# Patient Record
Sex: FEMALE | Race: WHITE
[De-identification: names, ages, dates, MRNs, and addresses within clinical notes are randomized per-mention and may not be internally consistent; named-entity substitution may affect disease eponyms.]

---

## 2018-05-04 ENCOUNTER — HOSPITAL ENCOUNTER (EMERGENCY)
Dept: HOSPITAL 62 - ER | Age: 28
Discharge: HOME | End: 2018-05-04
Payer: OTHER GOVERNMENT

## 2018-05-04 VITALS — DIASTOLIC BLOOD PRESSURE: 67 MMHG | SYSTOLIC BLOOD PRESSURE: 108 MMHG

## 2018-05-04 DIAGNOSIS — R10.30: ICD-10-CM

## 2018-05-04 DIAGNOSIS — R11.0: ICD-10-CM

## 2018-05-04 DIAGNOSIS — N76.0: Primary | ICD-10-CM

## 2018-05-04 DIAGNOSIS — B96.89: ICD-10-CM

## 2018-05-04 LAB
ADD MANUAL DIFF: NO
ALBUMIN SERPL-MCNC: 4.1 G/DL (ref 3.5–5)
ALP SERPL-CCNC: 57 U/L (ref 38–126)
ALT SERPL-CCNC: 25 U/L (ref 9–52)
ANION GAP SERPL CALC-SCNC: 13 MMOL/L (ref 5–19)
APPEARANCE UR: (no result)
APTT PPP: YELLOW S
AST SERPL-CCNC: 22 U/L (ref 14–36)
BACTERIA (WET MOUNT): (no result)
BASOPHILS # BLD AUTO: 0 10^3/UL (ref 0–0.2)
BASOPHILS NFR BLD AUTO: 0.4 % (ref 0–2)
BILIRUB SERPL-MCNC: < 0.1 MG/DL (ref 0.2–1.3)
BILIRUB UR QL STRIP: NEGATIVE
BUN SERPL-MCNC: 9 MG/DL (ref 7–20)
CALCIUM: 9.3 MG/DL (ref 8.4–10.2)
CHLAM PCR: NOT DETECTED
CHLORIDE SERPL-SCNC: 103 MMOL/L (ref 98–107)
CO2 SERPL-SCNC: 26 MMOL/L (ref 22–30)
EOSINOPHIL # BLD AUTO: 0 10^3/UL (ref 0–0.6)
EOSINOPHIL NFR BLD AUTO: 0.3 % (ref 0–6)
ERYTHROCYTE [DISTWIDTH] IN BLOOD BY AUTOMATED COUNT: 13.7 % (ref 11.5–14)
GLUCOSE SERPL-MCNC: 83 MG/DL (ref 75–110)
GLUCOSE UR STRIP-MCNC: NEGATIVE MG/DL
GON PCR: NOT DETECTED
HCT VFR BLD CALC: 39 % (ref 36–47)
HGB BLD-MCNC: 12.9 G/DL (ref 12–15.5)
KETONES UR STRIP-MCNC: NEGATIVE MG/DL
LYMPHOCYTES # BLD AUTO: 2.8 10^3/UL (ref 0.5–4.7)
LYMPHOCYTES NFR BLD AUTO: 31.8 % (ref 13–45)
MCH RBC QN AUTO: 30.2 PG (ref 27–33.4)
MCHC RBC AUTO-ENTMCNC: 33.1 G/DL (ref 32–36)
MCV RBC AUTO: 91 FL (ref 80–97)
MONOCYTES # BLD AUTO: 0.6 10^3/UL (ref 0.1–1.4)
MONOCYTES NFR BLD AUTO: 6.4 % (ref 3–13)
NEUTROPHILS # BLD AUTO: 5.4 10^3/UL (ref 1.7–8.2)
NEUTS SEG NFR BLD AUTO: 61.1 % (ref 42–78)
NITRITE UR QL STRIP: NEGATIVE
PH UR STRIP: 5 [PH] (ref 5–9)
PLATELET # BLD: 266 10^3/UL (ref 150–450)
POTASSIUM SERPL-SCNC: 4.1 MMOL/L (ref 3.6–5)
PROT SERPL-MCNC: 7.5 G/DL (ref 6.3–8.2)
PROT UR STRIP-MCNC: NEGATIVE MG/DL
RBC # BLD AUTO: 4.27 10^6/UL (ref 3.72–5.28)
RBCS (WET MOUNT): (no result)
SODIUM SERPL-SCNC: 142.3 MMOL/L (ref 137–145)
SP GR UR STRIP: 1.01
T.VAGINALIS (WET MOUNT): (no result)
TOTAL CELLS COUNTED % (AUTO): 100 %
UROBILINOGEN UR-MCNC: NEGATIVE MG/DL (ref ?–2)
WBC # BLD AUTO: 8.9 10^3/UL (ref 4–10.5)
WBCS (WET MOUNT): (no result)
YEAST (WET MOUNT): (no result)

## 2018-05-04 PROCEDURE — 80053 COMPREHEN METABOLIC PANEL: CPT

## 2018-05-04 PROCEDURE — S0119 ONDANSETRON 4 MG: HCPCS

## 2018-05-04 PROCEDURE — 99283 EMERGENCY DEPT VISIT LOW MDM: CPT

## 2018-05-04 PROCEDURE — 85025 COMPLETE CBC W/AUTO DIFF WBC: CPT

## 2018-05-04 PROCEDURE — 81001 URINALYSIS AUTO W/SCOPE: CPT

## 2018-05-04 PROCEDURE — 87210 SMEAR WET MOUNT SALINE/INK: CPT

## 2018-05-04 PROCEDURE — 87491 CHLMYD TRACH DNA AMP PROBE: CPT

## 2018-05-04 PROCEDURE — 87591 N.GONORRHOEAE DNA AMP PROB: CPT

## 2018-05-04 PROCEDURE — 36415 COLL VENOUS BLD VENIPUNCTURE: CPT

## 2018-05-04 PROCEDURE — 84703 CHORIONIC GONADOTROPIN ASSAY: CPT

## 2018-05-04 NOTE — ER DOCUMENT REPORT
ED Medical Screen (RME)





- General


Chief Complaint: Abdominal Pain


Stated Complaint: PELVIC/BACK PAIN, VAGINAL DISCHARGE


Time Seen by Provider: 05/04/18 13:30


Notes: 





28-year-old female patient reports LMP 4/19/2018, was on time but would lighter 

than usual.  She reports onset 4 days ago of low back and pelvic area pain and 

discomfort which has been getting worse.  This morning she developed a brownish 

red discharge which is unusual and not like anything she is seen before.  She 

is not on birth control.








I have greeted and performed a rapid initial assessment of this patient.  A 

comprehensive ED assessment and evaluation of the patient, analysis of test 

results and completion of the medical decision making process will be conducted 

by additional ED providers.


TRAVEL OUTSIDE OF THE [place] IN LAST 30 DAYS: No





Past Medical History





- Social History


Chew tobacco use (# tins/day): No


Frequency of alcohol use: None


Drug Abuse: None


Renal/ Medical History: Denies: Hx Peritoneal Dialysis





Physical Exam





- Vital signs


Vitals: 





 











Temp Pulse Resp BP Pulse Ox


 


 97.9 F   66   16   115/68   98 


 


 05/04/18 13:17  05/04/18 13:17  05/04/18 13:17  05/04/18 13:17  05/04/18 13:17














Course





- Vital Signs


Vital signs: 





 











Temp Pulse Resp BP Pulse Ox


 


 97.9 F   66   16   115/68   98 


 


 05/04/18 13:17  05/04/18 13:17  05/04/18 13:17  05/04/18 13:17  05/04/18 13:17

## 2018-05-04 NOTE — ER DOCUMENT REPORT
ED General





- General


Chief Complaint: Abdominal Pain


Stated Complaint: PELVIC/BACK PAIN, VAGINAL DISCHARGE


Time Seen by Provider: 05/04/18 13:30


Mode of Arrival: Ambulatory


Information source: Patient


Notes: 





28-year-old female with a history of anxiety presents with complaint of 

abdominal pain that started 5 days prior to arrival.  Pain is located in the 

lower abdomen, described as cramping, intermittent and not relieved with 

Tylenol or Motrin.  Patient has had associated nausea without vomiting.  Her 

last menstrual period was April 19, 2018.  She states that she has had some 

brown bloody discharge over the last few days.  She is currently sexually 

active with one partner and does not use birth control.  She denies sick 

contacts, new medications, recent antibiotic use.


TRAVEL OUTSIDE OF THE [place] IN LAST 30 DAYS: No





- HPI


Onset: Last week


Onset/Duration: Gradual, Intermittent


Quality of pain: Cramping


Associated symptoms: Nausea.  denies: Chest pain, Diarrhea, Fever, Vomiting, 

Shortness of breath, Weakness


Exacerbated by: Denies


Relieved by: Denies


Similar symptoms previously: No


Recently seen / treated by doctor: No





Past Medical History





- General


Information source: Patient





- Social History


Smoking Status: Never Smoker


Chew tobacco use (# tins/day): No


Frequency of alcohol use: None


Drug Abuse: None


Lives with: Spouse/Significant other


Family History: Reviewed & Not Pertinent


Patient has suicidal ideation: No


Patient has homicidal ideation: No





- Medical History


Medical History: Other - Anxiety


Renal/ Medical History: Denies: Hx Peritoneal Dialysis





Review of Systems





- Review of Systems


Notes: 





Patient denies fever, chills, vomiting, headache, ear pain, sore throat, cough, 

chest pain, shortness of breath, back pain, dysuria, hematuria, rash, SI/HI.





Physical Exam





- Vital signs


Vitals: 


 











Temp Pulse Resp BP Pulse Ox


 


 97.9 F   66   16   115/68   98 


 


 05/04/18 13:17  05/04/18 13:17  05/04/18 13:17  05/04/18 13:17  05/04/18 13:17











Interpretation: Normal.  No: Hypertensive, Febrile





- Notes


Notes: 





PHYSICAL EXAMINATION:





GENERAL: Well-appearing, well-nourished and in no acute distress.





HEAD: Atraumatic, normocephalic.





EYES: Pupils equal round and reactive to light, extraocular movements intact, 

conjunctiva are normal.





ENT: Nares patent, oropharynx clear without exudates.  Moist mucous membranes.





NECK: Normal range of motion, supple without lymphadenopathy





LUNGS: Breath sounds clear to auscultation bilaterally and equal.  No wheezes 

rales or rhonchi.





HEART: Regular rate and rhythm without murmurs





ABDOMEN: Soft, nontender, nondistended abdomen.  No guarding, no rebound.  No 

masses appreciated.





Female : Thin white discharge, no foul odor, no vaginal lacerations, no 

external lesions.  There is a circular yellow 0.25 cm area on the cervix that 

was not tender with manipulation.  No CMT.





Musculoskeletal: Normal range of motion, no pitting or edema.  No cyanosis.





NEUROLOGICAL: Cranial nerves grossly intact.  Normal speech, normal gait.  

Normal sensory, motor exams





PSYCH: Normal mood, normal affect.





SKIN: Warm, Dry, normal turgor, no rashes or lesions noted.





Course





- Re-evaluation


Re-evalutation: 








Laboratory











  05/04/18 05/04/18 05/04/18





  13:40 13:40 13:40


 


WBC  8.9  


 


RBC  4.27  


 


Hgb  12.9  


 


Hct  39.0  


 


MCV  91  


 


MCH  30.2  


 


MCHC  33.1  


 


RDW  13.7  


 


Plt Count  266  


 


Seg Neutrophils %  61.1  


 


Lymphocytes %  31.8  


 


Monocytes %  6.4  


 


Eosinophils %  0.3  


 


Basophils %  0.4  


 


Absolute Neutrophils  5.4  


 


Absolute Lymphocytes  2.8  


 


Absolute Monocytes  0.6  


 


Absolute Eosinophils  0.0  


 


Absolute Basophils  0.0  


 


Sodium   142.3 


 


Potassium   4.1 


 


Chloride   103 


 


Carbon Dioxide   26 


 


Anion Gap   13 


 


BUN   9 


 


Creatinine   0.68 


 


Est GFR ( Amer)   > 60 


 


Est GFR (Non-Af Amer)   > 60 


 


Glucose   83 


 


Calcium   9.3 


 


Total Bilirubin   < 0.1 L 


 


Direct Bilirubin    


 


Neonat Total Bilirubin   Not Reportable 


 


Neonat Direct Bilirubin   Not Reportable 


 


Neonat Indirect Bili   Not Reportable 


 


AST   22 


 


ALT   25 


 


Alkaline Phosphatase   57 


 


Total Protein   7.5 


 


Albumin   4.1 


 


Serum HCG, Qual    NEGATIVE


 


Urine Color   


 


Urine Appearance   


 


Urine pH   


 


Ur Specific Gravity   


 


Urine Protein   


 


Urine Glucose (UA)   


 


Urine Ketones   


 


Urine Blood   


 


Urine Nitrite   


 


Urine Bilirubin   


 


Urine Urobilinogen   


 


Ur Leukocyte Esterase   


 


Urine WBC (Auto)   


 


Urine RBC (Auto)   


 


Squamous Epi Cells Auto   


 


Urine Mucus (Auto)   


 


Urine Ascorbic Acid   


 


Bacteria (Wet Prep)   


 


Trichomonas (Wet Prep)   


 


Vaginal WBC   


 


Vaginal RBC   


 


Vaginal Yeast   














  05/04/18 05/04/18





  13:40 14:56


 


WBC  


 


RBC  


 


Hgb  


 


Hct  


 


MCV  


 


MCH  


 


MCHC  


 


RDW  


 


Plt Count  


 


Seg Neutrophils %  


 


Lymphocytes %  


 


Monocytes %  


 


Eosinophils %  


 


Basophils %  


 


Absolute Neutrophils  


 


Absolute Lymphocytes  


 


Absolute Monocytes  


 


Absolute Eosinophils  


 


Absolute Basophils  


 


Sodium  


 


Potassium  


 


Chloride  


 


Carbon Dioxide  


 


Anion Gap  


 


BUN  


 


Creatinine  


 


Est GFR ( Amer)  


 


Est GFR (Non-Af Amer)  


 


Glucose  


 


Calcium  


 


Total Bilirubin  


 


Direct Bilirubin  


 


Neonat Total Bilirubin  


 


Neonat Direct Bilirubin  


 


Neonat Indirect Bili  


 


AST  


 


ALT  


 


Alkaline Phosphatase  


 


Total Protein  


 


Albumin  


 


Serum HCG, Qual  


 


Urine Color  YELLOW 


 


Urine Appearance  SLIGHTLY-CLOUDY 


 


Urine pH  5.0 


 


Ur Specific Gravity  1.014 


 


Urine Protein  NEGATIVE 


 


Urine Glucose (UA)  NEGATIVE 


 


Urine Ketones  NEGATIVE 


 


Urine Blood  SMALL H 


 


Urine Nitrite  NEGATIVE 


 


Urine Bilirubin  NEGATIVE 


 


Urine Urobilinogen  NEGATIVE 


 


Ur Leukocyte Esterase  TRACE H 


 


Urine WBC (Auto)  4 


 


Urine RBC (Auto)  1 


 


Squamous Epi Cells Auto  5 


 


Urine Mucus (Auto)  RARE 


 


Urine Ascorbic Acid  NEGATIVE 


 


Bacteria (Wet Prep)   4+ BACTERIA SEEN


 


Trichomonas (Wet Prep)   NO TRICHOMONAS SEEN


 


Vaginal WBC   2+ WBCS SEEN


 


Vaginal RBC   NO RBCS SEEN


 


Vaginal Yeast   NO YEAST SEEN











05/04/18 14:24


28-year-old female with a history of anxiety presents with complaint of 

abdominal pain that started 5 days prior to arrival.  Pain is located in the 

lower abdomen, described as cramping, intermittent and not relieved with 

Tylenol or Motrin.  Patient has had associated nausea without vomiting.  Her 

last menstrual period was April 19, 2018.  She states that she has had some 

brown bloody discharge over the last few days.  She is currently sexually 

active with one partner and does not use birth control.  She denies sick 

contacts, new medications, recent antibiotic use.  Upon arrival vitals reviewed 

and within normal limits.  Patient does not appear toxic or dehydrated.  She is 

in no acute distress.  Exam and laboratory findings consistent with bacterial 

vaginosis.  She will be prescribed Flagyl for 1 week.


05/04/18 15:58





05/04/18 21:07








- Vital Signs


Vital signs: 


 











Temp Pulse Resp BP Pulse Ox


 


 98.4 F   55 L  18   108/67   100 


 


 05/04/18 16:26  05/04/18 16:26  05/04/18 16:26  05/04/18 16:26  05/04/18 16:26














- Laboratory


Result Diagrams: 


 05/04/18 13:40





 05/04/18 13:40


Laboratory results interpreted by me: 


 











  05/04/18 05/04/18





  13:40 13:40


 


Total Bilirubin  < 0.1 L 


 


Urine Blood   SMALL H


 


Ur Leukocyte Esterase   TRACE H














Discharge





- Discharge


Clinical Impression: 


 Bacterial vaginosis





Condition: Good


Disposition: HOME, SELF-CARE


Instructions:  Vaginosis, Bacterial ([facility])


Additional Instructions: 


Follow up with your physician tomorrow for further care or return to the ED 

IMMEDIATELY if symptoms worsen or new concerns occur. If you cannot afford to 

follow up with your primary care physician a list of low cost clinics have been 

provided at the end of your discharge papers as well.


Prescriptions: 


Metronidazole [Flagyl 500 mg Tablet] 500 mg PO BID #14 tablet

## 2019-03-16 ENCOUNTER — HOSPITAL ENCOUNTER (INPATIENT)
Dept: HOSPITAL 62 - LC | Age: 29
LOS: 2 days | Discharge: HOME | End: 2019-03-18
Attending: OBSTETRICS & GYNECOLOGY | Admitting: OBSTETRICS & GYNECOLOGY
Payer: MEDICAID

## 2019-03-16 DIAGNOSIS — Z3A.38: ICD-10-CM

## 2019-03-16 LAB
ADD MANUAL DIFF: NO
APPEARANCE UR: (no result)
APTT PPP: YELLOW S
BARBITURATES UR QL SCN: NEGATIVE
BASOPHILS # BLD AUTO: 0 10^3/UL (ref 0–0.2)
BASOPHILS NFR BLD AUTO: 0.3 % (ref 0–2)
BILIRUB UR QL STRIP: NEGATIVE
EOSINOPHIL # BLD AUTO: 0 10^3/UL (ref 0–0.6)
EOSINOPHIL NFR BLD AUTO: 0.3 % (ref 0–6)
ERYTHROCYTE [DISTWIDTH] IN BLOOD BY AUTOMATED COUNT: 13.2 % (ref 11.5–14)
GLUCOSE UR STRIP-MCNC: NEGATIVE MG/DL
HCT VFR BLD CALC: 36.4 % (ref 36–47)
HGB BLD-MCNC: 12.2 G/DL (ref 12–15.5)
KETONES UR STRIP-MCNC: NEGATIVE MG/DL
LYMPHOCYTES # BLD AUTO: 2.7 10^3/UL (ref 0.5–4.7)
LYMPHOCYTES NFR BLD AUTO: 24 % (ref 13–45)
MCH RBC QN AUTO: 31.1 PG (ref 27–33.4)
MCHC RBC AUTO-ENTMCNC: 33.6 G/DL (ref 32–36)
MCV RBC AUTO: 92 FL (ref 80–97)
METHADONE UR QL SCN: NEGATIVE
MONOCYTES # BLD AUTO: 1.1 10^3/UL (ref 0.1–1.4)
MONOCYTES NFR BLD AUTO: 10.3 % (ref 3–13)
NEUTROPHILS # BLD AUTO: 7.2 10^3/UL (ref 1.7–8.2)
NEUTS SEG NFR BLD AUTO: 65.1 % (ref 42–78)
NITRITE UR QL STRIP: NEGATIVE
PCP UR QL SCN: NEGATIVE
PH UR STRIP: 5 [PH] (ref 5–9)
PLATELET # BLD: 192 10^3/UL (ref 150–450)
PROT UR STRIP-MCNC: 30 MG/DL
RBC # BLD AUTO: 3.93 10^6/UL (ref 3.72–5.28)
SP GR UR STRIP: 1.02
TOTAL CELLS COUNTED % (AUTO): 100 %
URINE AMPHETAMINES SCREEN: NEGATIVE
URINE BENZODIAZEPINES SCREEN: NEGATIVE
URINE COCAINE SCREEN: NEGATIVE
URINE MARIJUANA (THC) SCREEN: NEGATIVE
UROBILINOGEN UR-MCNC: 4 MG/DL (ref ?–2)
WBC # BLD AUTO: 11.1 10^3/UL (ref 4–10.5)

## 2019-03-16 PROCEDURE — 85027 COMPLETE CBC AUTOMATED: CPT

## 2019-03-16 PROCEDURE — 80307 DRUG TEST PRSMV CHEM ANLYZR: CPT

## 2019-03-16 PROCEDURE — 86900 BLOOD TYPING SEROLOGIC ABO: CPT

## 2019-03-16 PROCEDURE — 86850 RBC ANTIBODY SCREEN: CPT

## 2019-03-16 PROCEDURE — 86901 BLOOD TYPING SEROLOGIC RH(D): CPT

## 2019-03-16 PROCEDURE — 36415 COLL VENOUS BLD VENIPUNCTURE: CPT

## 2019-03-16 PROCEDURE — 85025 COMPLETE CBC W/AUTO DIFF WBC: CPT

## 2019-03-16 PROCEDURE — 81005 URINALYSIS: CPT

## 2019-03-16 PROCEDURE — 4A1HXCZ MONITORING OF PRODUCTS OF CONCEPTION, CARDIAC RATE, EXTERNAL APPROACH: ICD-10-PCS | Performed by: OBSTETRICS & GYNECOLOGY

## 2019-03-16 PROCEDURE — 86592 SYPHILIS TEST NON-TREP QUAL: CPT

## 2019-03-16 NOTE — ADMISSION PHYSICAL
=================================================================



=================================================================

Datetime Report Generated by CPN: 2019 23:04

   

   

=================================================================

CURRENT ADMISSION

=================================================================

   

Chief Complaint:  Uterine Contractions

Indication for Induction:  Not Applicable

Admit Impression :  Term, Intrauterine Pregnancy; Active Labor

Admit Plan:  Admit to Unit; Initiate Labor Protocol

   

=================================================================

ALLERGIES

=================================================================

   

Medication Allergies:  Yes

Medication Allergies:  Cefaclor (2019)

Latex:  Latex Allergies

   

=================================================================

OBSTETRICAL HISTORY

=================================================================

   

EDC:  2019 00:00

:  4

Para:  3

Term:  3

:  0

SAB:  0

IAB:  0

Living:  3

Gestational Diabetes:  No

Rh Sensitization:  No

Incompetent Cervix:  No

SURAJ:  No

Infertility:  No

ART Treatment:  No

Uterine Anomaly:  No

IUGR:  No

Hx Previous C/S:  No

Macrosomia:  No

Hx Loss/Stillborn:  No

PIH:  No

Hx  Death:  No

Placenta Previa/Abruption:  No

Depression/PP Depression:  No

PTL/PROM:  No

Post Partum Hemorrhage:  No

Current Pregnancy Procedures:  Ultrasound

Obstetrical History Comments:  G1 -   @ 40 weeks; Baby Girl

   7lbs 15oz, natural

   G2 -   @ 40 weeks; Baby Girl 6lbs 14oz, natural, GBS+

   G3 - 2013  @ 40 weeks; Baby Boy 7lbs 11oz, natural, cervical tear

   G4 - Current

   

=================================================================

***SEE PRENATAL RECORDS***

=================================================================

   

Alcohol:  No

Marijuana :  No

Cocaine:  No

Other Illicit Drugs:  No

Cigarettes:  Never Smoker. [id number]

   

=================================================================

MEDICAL HISTORY

=================================================================

   

Diabetes:  No

Blood Transfusion:  No

Pulmonary Disease (Asthma, TB):  No

Breast Disease:  No

Hypertension:  No

Gyn Surgery:  Yes

Heart Disease:  No

Hosp/Surgery:  Yes

Autoimmune Disorder:  No

Anesthetic Complications:  No

Kidney Disease:  No

Abnormal Pap Smear:  No

Neuro/Epilepsy:  No

Psychiatric Disorders:  No

Other Medical Diseases:  No

Hepatitis/Liver Disease:  No

Significant Family History:  No

Varicosities/Phlebitis:  No

Trauma/Violence :  No

Thyroid Dysfunction:  No

Medical History Comments:  Previous hospitalization - tonsillectomy

   (), childbirth x's 3

   

=================================================================

INFECTIOUS HISTORY

=================================================================

   

Gonorrhea:  No

Genital Herpes:  No

Chlamydia:  No

Tuberculosis:  No

Syphilis:  No

Hepatitis:  No

HIV/AIDS Exposure:  No

Rash or Viral Illness:  No

HPV:  No

   

=================================================================

PHYSICAL EXAM

=================================================================

   

General:  Normal

HEENT:  Normal

Neurologic:  Normal

Thyroid:  Deferred

Heart:  Normal

Lungs:  Normal

Breast:  Normal

Back:  Deferred

Abdomen:  Normal

Genitourinary Exam:  Normal

Extremities:  Normal

DTRs:  Normal

Pelvic Type:  Adequate

Vital Signs:  Reviewed; Within Normal Limits

   

=================================================================

VAGINAL EXAM

=================================================================

   

Dilatation:  9

Effacement:  90

Station:  0

   

=================================================================

MEMBRANES

=================================================================

   

Membranes:  Ruptured

Amniotic Fluid Color:  Meconium, Light

   

=================================================================

FETUS A

=================================================================

   

EGA:  38.5

Monitoring:  External US

FHR- Baseline:  150

Variability:  Moderate 6-25bpm

Accelerations:  10X10

Decelerations:  None

Estimated Fetal Weight (gm):  3800

Fetal Presentation:  Vertex

Admit Comment:  28yo  @ 38w5d presents to L_D with complaints of

   painful uterine contractions.  Patient reports good fm, no vb, no

   lof.  Pregnancy has been uncomplicated other than elevated 1hr

   glucola and normal 3hr diagnostic test.

   

=================================================================

PLANS FOR LABOR AND DELIVERY

=================================================================

   

Labor and Delivery:  None

Pain Management:  Epidural

Feeding Preference:  Formula

Benefit of Breast Feed Discussed:  Yes

Circumcision:  Yes

   

=================================================================

INFORMED CONSENT

=================================================================

   

Signature:  Electronically signed by Aranza Arriaga MD ([facility]) on

   3/16/2019 at 23:03  with User ID: [email or web address]

## 2019-03-17 LAB
ERYTHROCYTE [DISTWIDTH] IN BLOOD BY AUTOMATED COUNT: 13.4 % (ref 11.5–14)
HCT VFR BLD CALC: 32.8 % (ref 36–47)
HGB BLD-MCNC: 11 G/DL (ref 12–15.5)
MCH RBC QN AUTO: 31.3 PG (ref 27–33.4)
MCHC RBC AUTO-ENTMCNC: 33.4 G/DL (ref 32–36)
MCV RBC AUTO: 94 FL (ref 80–97)
PLATELET # BLD: 157 10^3/UL (ref 150–450)
RBC # BLD AUTO: 3.5 10^6/UL (ref 3.72–5.28)
WBC # BLD AUTO: 11.5 10^3/UL (ref 4–10.5)

## 2019-03-17 RX ADMIN — FAMOTIDINE SCH MG: 20 TABLET, FILM COATED ORAL at 21:32

## 2019-03-17 RX ADMIN — ACETAMINOPHEN AND CODEINE PHOSPHATE PRN EACH: 300; 30 TABLET ORAL at 08:37

## 2019-03-17 RX ADMIN — IBUPROFEN SCH MG: 800 TABLET, FILM COATED ORAL at 21:32

## 2019-03-17 RX ADMIN — IBUPROFEN SCH MG: 800 TABLET, FILM COATED ORAL at 14:00

## 2019-03-17 RX ADMIN — Medication SCH CAP: at 11:01

## 2019-03-17 RX ADMIN — SENNOSIDES, DOCUSATE SODIUM SCH EACH: 50; 8.6 TABLET, FILM COATED ORAL at 11:01

## 2019-03-17 RX ADMIN — ACETAMINOPHEN AND CODEINE PHOSPHATE PRN EACH: 300; 30 TABLET ORAL at 20:28

## 2019-03-17 RX ADMIN — DOCUSATE SODIUM SCH MG: 100 CAPSULE, LIQUID FILLED ORAL at 11:01

## 2019-03-17 RX ADMIN — FERROUS SULFATE TAB 325 MG (65 MG ELEMENTAL FE) SCH MG: 325 (65 FE) TAB at 11:01

## 2019-03-17 RX ADMIN — FERROUS SULFATE TAB 325 MG (65 MG ELEMENTAL FE) SCH MG: 325 (65 FE) TAB at 18:08

## 2019-03-17 RX ADMIN — DOCUSATE SODIUM SCH MG: 100 CAPSULE, LIQUID FILLED ORAL at 18:08

## 2019-03-17 RX ADMIN — FAMOTIDINE SCH MG: 20 TABLET, FILM COATED ORAL at 11:01

## 2019-03-17 RX ADMIN — IBUPROFEN SCH MG: 800 TABLET, FILM COATED ORAL at 05:53

## 2019-03-17 NOTE — WARNING SIGNS IN BABIES
=================================================================

VOD Warning Signs

=================================================================

Datetime Report Generated by N: 03/17/2019 00:14

   

VOD#608 -Warning Signs in Babies:  Viewed with Parent(s)/Family   

   (03/17/2019 00:00:Reny Odell RN)

## 2019-03-17 NOTE — DELIVERY SUMMARY
=================================================================

Del Sum A-C

=================================================================

Datetime Report Generated by CPN: 2019 00:07

   

   

=================================================================

DELIVERY PERSONNEL

=================================================================

   

DELIVERY PERSONNEL:  [id number]

Delivery Doctor::  Aranza Arriaga MD

Labor and Delivery Nurse::  Reny Odell RN

Labor and Delivery Nurse::  Stacey Arciniega RN

Nursery Nurse::  Regi Alvarado RN

Scrub Tech/CNA:  Tiffany Cotter, ST

   

=================================================================

MATERNAL INFORMATION

=================================================================

   

Delivery Anesthesia:  None

Medications After Delivery:  Pitocin 10 Units IM; Cytotec 1000mcg Per

   Rectum/Vagina

Maternal Complications:  Precipitous Labor (<3hrs)

   

=================================================================

LABOR SUMMARY

=================================================================

   

EDC:  2019 00:00

No. Babies in Womb:  1

 Attempted:  No

Labor Anesthesia:  None

   

=================================================================

LABOR INFORMATION

=================================================================

   

Reason for Induction:  Not Applicable

Onset of Labor:  2019 19:00

Complete Dilatation:  2019 22:27

Oxytocin:  N/A

Group B Beta Strep:  Negative

Steroids Given:  None

Reason Steroids Not Administered:  Not Applicable

   

=================================================================

MEMBRANES

=================================================================

   

Membranes Rupture Method:  Spontaneous

Rupture of Membranes:  2019 22:15

Length of Rupture (hr):  0.20

Amniotic Fluid Color:  Moderate Meconium

Amniotic Fluid Amount:  Moderate

Amniotic Fluid Odor:  Normal

   

=================================================================

STAGES OF LABOR

=================================================================

   

Stage 1 hr:  3

Stage 1 min:  27

Stage 2 hr:  0

Stage 2 min:  0

Stage 3 hr:  0

Stage 3 min:  4

Total Time in Labor hr:  3

Total Time in Labor min:  31

   

=================================================================

VAGINAL DELIVERY

=================================================================

   

Episiotomy:  None

Laceration #1:  None; Perineal

Laceration Extension #1:  First Degree

Laceration Repair:  No

Laceration Repair Note:  Small first degree that was hemostatic and

   patient was uncomfortable and therefore not repaired as patient had

   no pain meds.

Sponge Count Correct:  N/A

Sharps Count Correct:  N/A

   

=================================================================

CSECTION DELIVERY

=================================================================

   

Primary Indication:  N/A

Secondary Indication:  N/A

CSection Incidence:  N/A

Labor:  N/A

Elective:  Failed 

CSection Incision:  N/A

   

=================================================================

BABY A INFORMATION

=================================================================

   

Infant Delivery Date/Time:  2019 22:27

Method of Delivery:  Vaginal

Born in Route :  No

:  N/A

Forceps:  N/A

Vacuum Extraction:  N/A

Shoulder Dystocia :  No

   

=================================================================

PRESENTATION/POSITION BABY A

=================================================================

   

Presentation:  Cephalic

Cephalic Presentation:  Vertex

Vertex Position:  Right Occipital Anterior

Breech Presentation:  N/A

   

=================================================================

PLACENTA INFORMATION BABY A

=================================================================

   

Placenta Delivery Time :  2019 22:31

Placenta Method of Delivery:  Spontaneous

Placenta Status:  Delivered

   

=================================================================

APGAR SCORES BABY A

=================================================================

   

Heart Rate 1 min:  >100 bpm

Resp Effort 1 min:  Good Cry

Reflex Irritability 1 min:  Cough or Sneeze or Pulls Away

Muscle Tone 1 min:  Active Motion

Color 1 min:  Blue/Pale

Resuscitation Effort 1 min:  Tactile Stimulation

APGAR SCORE 1 MIN:  8

Heart Rate 5 min:  >100 bpm

Resp Effort 5 min:  Good Cry

Reflex Irritability 5 min:  Cough or Sneeze or Pulls Away

Muscle Tone 5 min:  Active Motion

Color 5 min:  Body Pink, Extremities Blue

Resuscitation Effort 5 min:  N/A

APGAR SCORE 5 MIN:  9

   

=================================================================

INFANT INFORMATION BABY A

=================================================================

   

Gestational Age at Delivery:  38.5

Gestational Status:  Early Term- 37- 38.6 Weeks

Infant Outcome :  Liveborn

Infant Condition :  Stable

Infant Sex:  Male

   

=================================================================

IDENTIFICATION BABY A

=================================================================

   

Infant Verification Date/Time:  2019 22:38

ID Band Number:  [id number]

Mother's Name Verified:  Yes

Infant Medical Record Number:  [id number]

RN Verifying Infant:  PATI LaneSharon RN

Additional Verifying Personnel:  Ring, B. RN

   

=================================================================

WEIGHT/LENGTH BABY A

=================================================================

   

Infant Birthweight (gm):  3554

Infant Weight (lb):  7

Infant Weight (oz):  13

Infant Length (in):  20.47

Infant Length (cm):  51.99

   

=================================================================

CORD INFORMATION BABY A

=================================================================

   

No. Cord Vessels:  3

Nuchal Cord :  N/A

Cord Blood Taken:  Yes-For Storage (Mom's Blood type +)

Infant Suction:  Mouth; Nose

   

=================================================================

ASSESSMENT BABY A

=================================================================

   

Infant Complications:  Meconium

Physical Findings- Other:  See full nursery RN assessment

Neonatologist/ALS Called :  No

Infant Care By:  EDDIESharon Christiano RN

Transferred To:  Remains with Mother

   

=================================================================

BABY B INFORMATION

=================================================================

   

 :  N/A

   

=================================================================

SIGNATURES

=================================================================

   

Signature:  Electronically signed by [facility] on

   3/16/2019 at 23:09  with User ID: [email or web address]

## 2019-03-18 VITALS — SYSTOLIC BLOOD PRESSURE: 110 MMHG | DIASTOLIC BLOOD PRESSURE: 65 MMHG

## 2019-03-18 RX ADMIN — DOCUSATE SODIUM SCH MG: 100 CAPSULE, LIQUID FILLED ORAL at 09:10

## 2019-03-18 RX ADMIN — IBUPROFEN SCH MG: 800 TABLET, FILM COATED ORAL at 06:00

## 2019-03-18 RX ADMIN — IBUPROFEN SCH: 800 TABLET, FILM COATED ORAL at 14:31

## 2019-03-18 RX ADMIN — SENNOSIDES, DOCUSATE SODIUM SCH EACH: 50; 8.6 TABLET, FILM COATED ORAL at 09:10

## 2019-03-18 RX ADMIN — FERROUS SULFATE TAB 325 MG (65 MG ELEMENTAL FE) SCH MG: 325 (65 FE) TAB at 09:10

## 2019-03-18 RX ADMIN — Medication SCH CAP: at 09:10

## 2019-03-18 RX ADMIN — FAMOTIDINE SCH MG: 20 TABLET, FILM COATED ORAL at 09:10

## 2019-03-18 NOTE — PDOC DISCHARGE SUMMARY
Final Diagnosis


Discharge Date: 03/18/19





- Final Diagnosis


(1) Normal vaginal delivery


Is this a current diagnosis for this admission?: Yes   





Discharge Data





- Discharge Medication


Home Medications: 








Citalopram Hydrobromide [Celexa] 60 mg PO DAILY 05/04/18 


Pnv No.95/Ferrous Fum/Folic AC [Prenatal Multivitamin Tablet] 1 tab PO DAILY 

03/16/19 








Reason(s) for Admission: Onset of Labor


Prenatal Procedures: NST


Intrapartum Procedure(s): Spontaneous Vaginal Delivery


Postpartum Complication(s): Laceration-Perineal


Laceration-Degree: 1st





- Diagnosis Test


Laboratory: 


                                        











Temp Pulse Resp BP Pulse Ox


 


 97.3 F   64   16   110/65   99 


 


 03/18/19 07:48  03/18/19 07:48  03/18/19 07:48  03/18/19 07:48  03/18/19 07:48








                                        











  03/16/19 03/16/19 03/17/19





  21:45 22:11 07:40


 


RBC   3.93  3.50 L


 


Hgb   12.2  11.0 L


 


Hct   36.4  32.8 L


 


Urine Opiates Screen  NEGATIVE  














- Discharge information/Instructions


Discharge Activity: Activity As Tolerated, Pelvic Rest


Discharge Diet: Regular


Disposition: HOME, SELF-CARE


Follow up with: [facility]


in: 4, Weeks